# Patient Record
(demographics unavailable — no encounter records)

---

## 2025-02-27 NOTE — ASSESSMENT
[FreeTextEntry1] : 1. BPH -- 103 grams -- Urinary bladder US from 12/2020.  2. ED- on sildenafil 150 mg 3. LUTS -- mild -- on finasteride 5mg and tamsulosin .4mg  Plan:  -Discussed PSA results -Discussed significance of PSA testing. -Discussed the significance of sildenafil for ED -Continue tamsulosin .4mg PO QD and finasteride 5mg PO QD for LUTS.  -Begin Sildenafil 100 MG PO QD PRN, titrate as needed. Patient understands not to take more than 100 MG in 24 hr. Patient understands to d/c if he experiences side effects relating to ears/eyes. -PSA now, as patient did not obtain any 2024 PSA value. Will call if PSA results are clinically indicated. -PSA 12 months -RTO 12 months.  -Patient has no other additional questions.

## 2025-02-27 NOTE — PHYSICAL EXAM
[Normal Appearance] : normal appearance [Well Groomed] : well groomed [General Appearance - In No Acute Distress] : no acute distress [Edema] : no peripheral edema [Respiration, Rhythm And Depth] : normal respiratory rhythm and effort [Exaggerated Use Of Accessory Muscles For Inspiration] : no accessory muscle use [Abdomen Soft] : soft [Abdomen Tenderness] : non-tender [Costovertebral Angle Tenderness] : no ~M costovertebral angle tenderness [Normal Station and Gait] : the gait and station were normal for the patient's age [] : no rash [No Focal Deficits] : no focal deficits [Oriented To Time, Place, And Person] : oriented to person, place, and time [Affect] : the affect was normal [Mood] : the mood was normal [No Palpable Adenopathy] : no palpable adenopathy [Chaperone Declined] : Patient declined chaperone

## 2025-02-27 NOTE — END OF VISIT
[Time Spent: ___ minutes] : I have spent [unfilled] minutes of time on the encounter which excludes teaching and separately reported services. [4. Prevent psychological harm to patient or others] : Reason - Prevent psychological harm to patient or others [FreeTextEntry3] : Patient notes was transcribed by [email or web address]  under the supervision of Dr. Littlejohn. And I have   reviewed the patient's chart and agree that it aligns   with  my medical decisions.